# Patient Record
Sex: MALE | Race: BLACK OR AFRICAN AMERICAN | Employment: STUDENT | URBAN - METROPOLITAN AREA
[De-identification: names, ages, dates, MRNs, and addresses within clinical notes are randomized per-mention and may not be internally consistent; named-entity substitution may affect disease eponyms.]

---

## 2018-05-23 ENCOUNTER — HOSPITAL ENCOUNTER (OUTPATIENT)
Dept: OTHER | Age: 23
Discharge: HOME | End: 2018-05-31
Attending: EMERGENCY MEDICINE | Admitting: EMERGENCY MEDICINE

## 2018-05-25 ENCOUNTER — HOSPITAL ENCOUNTER (OUTPATIENT)
Dept: PHYSICAL THERAPY | Age: 23
Discharge: HOME | End: 2018-05-26
Admitting: EMERGENCY MEDICINE

## 2018-05-29 ENCOUNTER — HOSPITAL ENCOUNTER (OUTPATIENT)
Dept: PHYSICAL THERAPY | Age: 23
Discharge: HOME | End: 2018-05-30
Admitting: EMERGENCY MEDICINE

## 2018-05-30 ENCOUNTER — HOSPITAL ENCOUNTER (OUTPATIENT)
Dept: PHYSICAL THERAPY | Age: 23
Discharge: HOME | End: 2018-06-30
Admitting: EMERGENCY MEDICINE

## 2018-05-31 ENCOUNTER — HOSPITAL ENCOUNTER (OUTPATIENT)
Dept: PHYSICAL THERAPY | Age: 23
Discharge: HOME | End: 2018-06-01
Admitting: EMERGENCY MEDICINE

## 2018-06-01 ENCOUNTER — HOSPITAL ENCOUNTER (OUTPATIENT)
Dept: OTHER | Age: 23
Discharge: HOME | End: 2018-06-01
Attending: EMERGENCY MEDICINE | Admitting: EMERGENCY MEDICINE

## 2018-06-04 ENCOUNTER — HOSPITAL ENCOUNTER (OUTPATIENT)
Dept: PHYSICAL THERAPY | Age: 23
Discharge: HOME | End: 2018-06-05
Admitting: EMERGENCY MEDICINE

## 2018-06-06 ENCOUNTER — HOSPITAL ENCOUNTER (OUTPATIENT)
Dept: PHYSICAL THERAPY | Age: 23
Discharge: HOME | End: 2018-06-07
Admitting: EMERGENCY MEDICINE

## 2018-06-12 ENCOUNTER — HOSPITAL ENCOUNTER (OUTPATIENT)
Dept: PHYSICAL THERAPY | Age: 23
Discharge: HOME | End: 2018-06-13
Admitting: EMERGENCY MEDICINE

## 2018-06-14 ENCOUNTER — HOSPITAL ENCOUNTER (OUTPATIENT)
Dept: PHYSICAL THERAPY | Age: 23
Discharge: HOME | End: 2018-06-15
Admitting: EMERGENCY MEDICINE

## 2018-06-18 ENCOUNTER — HOSPITAL ENCOUNTER (OUTPATIENT)
Dept: PHYSICAL THERAPY | Age: 23
Discharge: HOME | End: 2018-06-19
Admitting: EMERGENCY MEDICINE

## 2018-06-21 ENCOUNTER — HOSPITAL ENCOUNTER (OUTPATIENT)
Dept: PHYSICAL THERAPY | Age: 23
Discharge: HOME | End: 2018-06-22
Admitting: EMERGENCY MEDICINE

## 2018-06-25 ENCOUNTER — HOSPITAL ENCOUNTER (OUTPATIENT)
Dept: PHYSICAL THERAPY | Age: 23
Discharge: HOME | End: 2018-06-26
Admitting: EMERGENCY MEDICINE

## 2018-06-28 ENCOUNTER — HOSPITAL ENCOUNTER (OUTPATIENT)
Dept: PHYSICAL THERAPY | Age: 23
Discharge: HOME | End: 2018-06-29
Admitting: EMERGENCY MEDICINE

## 2018-07-01 ENCOUNTER — HOSPITAL ENCOUNTER (OUTPATIENT)
Dept: OTHER | Age: 23
Discharge: HOME | End: 2018-07-31
Attending: EMERGENCY MEDICINE | Admitting: EMERGENCY MEDICINE

## 2018-07-10 ENCOUNTER — HOSPITAL ENCOUNTER (OUTPATIENT)
Dept: PHYSICAL THERAPY | Age: 23
Discharge: HOME | End: 2018-07-11
Admitting: EMERGENCY MEDICINE

## 2018-07-10 NOTE — PLAN OF CARE
Retro Stepper/BIKE   4min retro for glute activation    Slide retro lunges  2 10 70 deg -KB   RDL 2 15 50lbs    BOSU bridge- band 5s 10 Single leg holds (add SC pull NV)         Hip Ext /table 5s 10 Off table SS end    Bosu fwd/side lunge      Box Ecc control earth quake 2 12 Slow cuing for quad 0-50- no wt   Leg Press Ecc 0-70- slow  10 10 170/150lbs 0-70 heel up    Box /Floor plyos 10 10 Uni focusing on single leg acceptance    SC jogging and deceleration  10 10 Fwd/back    Glute kicks  4 15 2 maroon   TRX Uni Lunges 2 12 0-50   English Lunges with HHA 10 10 0-60 heel up    Sumo/[place]  10 10 Slow /SS-50   Chair single leg squat 10 10 2 airex   Manual Intervention-0min      Knee mobs/PROM      Tib/Fem Mobs      Patella Mobs  4 g. III/iv inf/sup/rot   Ankle mobs      DN   See below         NMR [id number]      BFR  10min 5lb, 168mmHg 30/15/15/15 (SLR, LAQ, retro lunge, 6in step up, squat)   G. Med activation/sidelying      E[place]on uni plyos NMR control Rt LE   4min     Hip Ext full ROM G. Activation      Bosu Bal and Prop- G Med 10 10 Quad control throughout rom   Single leg stance/Balance/Prop      Bosu Retro G. Med act 10 10 Glut control       Cues throughout for glute control           Spoke with Dr. Rhoda Scott regarding the use of BFR with patient. Bloodflow restriction was utilized throughout exercise session with use of Lovely Unit for a period of 8 minutes at  80% Occlusion. Patient's total limp occlusion pressure was calculated and monitored by the [facility] for the entire time of occlusion. Therapeutic Exercise and NMR EXR  [x] (27977) Provided verbal/tactile cueing for activities related to strengthening, flexibility, endurance, ROM for improvements in LE, proximal hip, and core control with self care, mobility, lifting, ambulation.   [x] (68615) Provided verbal/tactile cueing for activities related to improving balance, coordination, kinesthetic sense, posture, motor skill, proprioception RE-EVAL     [x] SC(78327) x  3   [] IONTO  [x] NMR (65199) x  1   [] VASO  [] Manual (79283) x       [] Other:  [] TA x       [] Mech Traction (89520)  [] ES(attended) (41048)      [] ES (un) (20425):     GOALS:  Patient stated goal: full FB in august    Therapist goals for Patient:   Short Term Goals: To be achieved in: 2 weeks  1. Independent in HEP and progression per patient tolerance, in order to prevent re-injury. -MET  2. Patient will have a decrease in pain to facilitate improvement in movement, function, and ADLs as indicated by Functional Deficits. -MET    Long Term Goals: To be achieved in: 8 weeks  1. Disability index score of 4% or less for the LEFS to assist with reaching prior level of function. -NOT MET  2. Patient will demonstrate increased AROM to WNL to allow for proper joint functioning as indicated by patients Functional Deficits. -MET  3. Patient will demonstrate an increase in Strength to good proximal hip strength and control, within 5lb HHD in LE to allow for proper functional mobility as indicated by patients Functional Deficits. -IMPROVED NOT MET  4. Patient will return to single leg eccentric functional activities without increased symptoms or restriction. -IMPROVED NOT MET  5. Patient to return to running, cutting, jumping without restriction. -PROGRESSING     Progression Towards Functional goals:  [x] Patient is progressing as expected towards functional goals listed. [] Progression is slowed due to complexities listed. [] Progression has been slowed due to co-morbidities. [] Plan just implemented, too soon to assess goals progression  [x] Other: anterior knee pain persist with jumping    ASSESSMENT:  Patient is demonstrated significant improvement associated with quadricep eccentric body weight control and lower extremity total body strength. Patient is able to ambulate transfer and squat with minimal to no complaints of anterior knee soreness or pain.   Continued weakness and reflexive inhibition persist with advanced sport activity including change of direction and cutting drills. Additional quadriceps eccentric control and strength is required before returning to full athletic activity. Return to Play: (if applicable)   [x]  Stage 1: Intro to Strength   [x]  Stage 2: Return to Run and Strength   [x]  Stage 3: Return to Jump and Strength   []  Stage 4: Dynamic Strength and Agility   []  Stage 5: Sport Specific Training     []  Ready to Return to Play, Meets All Above Stages   []  Not Ready for Return to Sports   Comments:                         Treatment/Activity Tolerance:  [x] Patient tolerated treatment well [] Patient limited by fatique  [] Patient limited by pain  [] Patient limited by other medical complications  [] Other:     Prognosis: [x] Good [] Fair  [] Poor    Patient Requires Follow-up: [x] Yes  [] No      PLAN: Progress with current physical therapy plan including lower summary body weight eccentric's and lower extremity plyometric activity working on single leg weight acceptance. Patient may require 1-2 additional dry needling treatments to the infrapatellar pole if anterior pain persists.     [x] Continue per plan of care [] Alter current plan (see comments)  [] Plan of care initiated [] Hold pending MD visit [] Discharge    Electronically signed by: Angie Olvera PT DPT [facility]

## 2018-07-12 ENCOUNTER — HOSPITAL ENCOUNTER (OUTPATIENT)
Dept: PHYSICAL THERAPY | Age: 23
Discharge: HOME | End: 2018-07-13
Admitting: EMERGENCY MEDICINE

## 2018-07-12 NOTE — FLOWSHEET NOTE
Provided training and instruction to the patient for proper LE, core and proximal hip recruitment and positioning and eccentric body weight control with ambulation re-education including up and down stairs     Home Exercise Program:    [x] (56011) Reviewed/Progressed HEP activities related to strengthening, flexibility, endurance, ROM of core, proximal hip and LE for functional self-care, mobility, lifting and ambulation/stair navigation   [] (77080)Reviewed/Progressed HEP activities related to improving balance, coordination, kinesthetic sense, posture, motor skill, proprioception of core, proximal hip and LE for self care, mobility, lifting, and ambulation/stair navigation      Manual Treatments:  PROM / STM / Oscillations-Mobs:  G-I, II, III, IV (PA's, Inf., Post.)  [x] (89723) Provided manual therapy to mobilize LE, proximal hip and/or LS spine soft tissue/joints for the purpose of modulating pain, promoting relaxation,  increasing ROM, reducing/eliminating soft tissue swelling/inflammation/restriction, improving soft tissue extensibility and allowing for proper ROM for normal function with self care, mobility, lifting and ambulation. Spoke with   regarding the use of Dry Needling     Dry needling manual therapy: consisted on the placement of 15 needles in the following muscles:  VM/VL/IT band and Heather Patella tendon. A 60-40 mm needle was inserted, piston, rotated, and coned to produce intramuscular mobilization. These techniques were used to restore functional range of motion, reduce muscle spasm and induce healing in the corresponding musculature. (62007)  Clean Technique was utilized today while applying Dry needling treatment. The treatment sites where cleaned with 70% solution of  isopropyl alcohol . The PT washed their hands and utilized treatment gloves along with hand  prior to inserting the needles.   All needles where removed and discarded in the appropriate sharps container. MD has given verbal and/or written approval for this treatment. Attended low frequency (1-20Hz) electrical stimulation was utilized in conjunction with Dry Needling:  the Estim was manipulated between all above needles for a period of 12 min. at 6  volts. The low frequency electrical stimulation was used to help reduce muscle spasm and help to interrupt /Lead[place] the pain cycle. (98954)     Modalities:  Game Ready x 10 min with high pressure. Charges:  Timed Code Treatment Minutes: 61   Total Treatment Minutes: 61     [] EVAL (LOW) 98682 (typically 20 minutes face-to-face)  [] EVAL (MOD) 98729 (typically 30 minutes face-to-face)  [] EVAL (HIGH) 57257 (typically 45 minutes face-to-face)  [] RE-EVAL     [x] PO(98600) x  2   [] IONTO  [x] NMR (19533) x  1   [] VASO  [x] Manual (97187) x  1    [] Other:  [] TA x       [] Mech Traction (94757)  [] ES(attended) (55611)      [] ES (un) (79806):     GOALS:  Patient stated goal: full FB in august    Therapist goals for Patient:   Short Term Goals: To be achieved in: 2 weeks  1. Independent in HEP and progression per patient tolerance, in order to prevent re-injury. 2. Patient will have a decrease in pain to facilitate improvement in movement, function, and ADLs as indicated by Functional Deficits. Long Term Goals: To be achieved in: 8 weeks  1. Disability index score of 4% or less for the LEFS to assist with reaching prior level of function. 2. Patient will demonstrate increased AROM to WNL to allow for proper joint functioning as indicated by patients Functional Deficits. 3. Patient will demonstrate an increase in Strength to good proximal hip strength and control, within 5lb HHD in LE to allow for proper functional mobility as indicated by patients Functional Deficits. 4. Patient will return to single leg eccentric functional activities without increased symptoms or restriction.    5. Patient to return to running, cutting, jumping without restriction. Progression Towards Functional goals:  [] Patient is progressing as expected towards functional goals listed. [] Progression is slowed due to complexities listed. [] Progression has been slowed due to co-morbidities. [x] Plan just implemented, too soon to assess goals progression  [] Other:     ASSESSMENT:  Patient continues to demonstrate improved quad/LE eccentric strength and control with unilateral activity. Additional LE eccentric control is required before beginning sprinting activity. Return to Play: (if applicable)   [x]  Stage 1: Intro to Strength   []  Stage 2: Return to Run and Strength   []  Stage 3: Return to Jump and Strength   []  Stage 4: Dynamic Strength and Agility   []  Stage 5: Sport Specific Training     []  Ready to Return to Play, Meets All Above Stages   []  Not Ready for Return to Sports   Comments:                         Treatment/Activity Tolerance:  [x] Patient tolerated treatment well [] Patient limited by fatique  [] Patient limited by pain  [] Patient limited by other medical complications  [] Other:     Prognosis: [x] Good [] Fair  [] Poor    Patient Requires Follow-up: [x] Yes  [] No      PLAN: Continue current physical therapy plan progress with lower extremity eccentric strength as tolerated as well as additional 1-2 dry needling treatments as needed per infrapatellar tendon pain.     [x] Continue per plan of care [] Alter current plan (see comments)  [] Plan of care initiated [] Hold pending MD visit [] Discharge    Electronically signed by: Ingrid Olvera PT

## 2018-07-17 ENCOUNTER — HOSPITAL ENCOUNTER (OUTPATIENT)
Dept: PHYSICAL THERAPY | Age: 23
Discharge: HOME | End: 2018-07-18
Admitting: EMERGENCY MEDICINE

## 2018-07-17 NOTE — FLOWSHEET NOTE
[address] [place] [address] [place] [address]  Phone: [phone] Fax: [phone]    Physical Therapy Daily Treatment Note  Date:  2018    Patient Name:  Yuri Polo    :  1995  MRN: [id number]  Restrictions/Precautions:    Medical/Treatment Diagnosis Information:  · Diagnosis: R knee patellar tendonitis  · Treatment Diagnosis: S49.772  Insurance/Certification information:  PT Insurance Information: [employer]  Physician Information:  Referring Practitioner: Tom Partida of care signed (Y/N):     Date of Patient follow up with Physician:     G-Code (if applicable):      Date G-Code Applied:    PT G-Codes  Functional Assessment Tool Used: LEFS  Score: 35%  Functional Limitation: Mobility: Walking and moving around  Mobility: Walking and Moving Around Current Status (): At least 20 percent but less than 40 percent impaired, limited or restricted  Mobility: Walking and Moving Around Goal Status (): At least 1 percent but less than 20 percent impaired, limited or restricted    Progress Note: [x]  Yes  []  No  Next due by: Visit #10       Latex Allergy:  [x]NO      []YES  Preferred Language for Healthcare:   [x]English       []other:    Visit # Insurance Allowable   15 60/MU   . Pain level:  4/10  Increased with cutting     SUBJECTIVE:  Patient reports with mild increase in anterior medial patellofemoral soreness and pain associated with cutting and running activities. Patient continues to experience mild difficulty and weakness associated with advanced athletic activity.     OBJECTIVE: See eval  Observation:   Test measurements:      RESTRICTIONS/PRECAUTIONS: poss PT tear    Exercises/Interventions:     Therapeutic Ex 44min Sets/sec Reps Notes   Retro Stepper/BIKE   4min retro for glute activation    Slide rotational lunges  2 10 Slow at 60 deg   RDL 2 15 50lbs    BOSU bridge 5s 10 Single leg holds (add SC pull NV)         Hip Ext /table 5s 10 Off table SS end    Bosu fwd/side lunge      Box Ecc control earth quake 2 12 Slow cuing for quad 0-50- no wt   Leg Press Ecc 0-70 10 10 170/150lbs 0-70 heel up    Box plyo lunges  2 12 12' bossman/broads/2:1- P   Low plyos sith Rt leg in Ecc pos 2 20 60 deg. Glute kicks  4 15 2 maroon   TRX Uni Lunges 2 12 0-50   [place] Lunges with HHA 10 10 0-60 slosh tube with pertubation    Sumo/Indonesian  10 10 Slow /SS-50   BFR Strength   8min 5lb, 150mmHg 30/15/15/15 (SLR, LAQ, retro lunge, 6in step up, squat)   Chair Uni  Squats 90 deg 10 10 2 x airex         Manual Intervention-4min      Knee mobs/PROM      Tib/Fem Mobs      Patella Mobs  4 g. III/iv inf/sup/rot   Ankle mobs            NMR [id number]      G. Med activation/sidelying      G. Max Activation/prone      Hip Ext full ROM G. Activation      Bosu Bal and Prop- G Med 10 10 Quad control throughout rom   Single leg stance/Balance/Prop      Bosu Retro G. Med act 10 10 Glut control       Cues throughout for glute control         Full LE eccentric program with focus on quad ecc control and LE plyometric with landing control    **  BFR per Dr. Ariane Stewart 158mmhg    . Spoke with Dr. Ariane Stewart regarding the use of BFR with patient. Bloodflow restriction was utilized throughout exercise session with use of Lovely Unit for a period of 8 minutes at  80% Occlusion. Patient's total limp occlusion pressure was calculated and monitored by the [facility] for the entire time of occlusion. Therapeutic Exercise and NMR EXR  [x] (82177) Provided verbal/tactile cueing for activities related to strengthening, flexibility, endurance, ROM for improvements in LE, proximal hip, and core control with self care, mobility, lifting, ambulation.   [x] (34473) Provided verbal/tactile cueing for activities related to improving balance, coordination, kinesthetic sense, posture, motor skill, proprioception  to assist with LE, proximal hip, and core control in self care, mobility, lifting, ambulation and eccentric single leg control. NMR and Therapeutic Activities:    [x] (60462 or 61712) Provided verbal/tactile cueing for activities related to improving balance, coordination, kinesthetic sense, posture, motor skill, proprioception and motor activation to allow for proper function of core, proximal hip and LE with self care and ADLs  [] (96871) Gait Re-education- Provided training and instruction to the patient for proper LE, core and proximal hip recruitment and positioning and eccentric body weight control with ambulation re-education including up and down stairs     Home Exercise Program:    [x] (36678) Reviewed/Progressed HEP activities related to strengthening, flexibility, endurance, ROM of core, proximal hip and LE for functional self-care, mobility, lifting and ambulation/stair navigation   [] (16946)Reviewed/Progressed HEP activities related to improving balance, coordination, kinesthetic sense, posture, motor skill, proprioception of core, proximal hip and LE for self care, mobility, lifting, and ambulation/stair navigation      Manual Treatments:  PROM / STM / Oscillations-Mobs:  G-I, II, III, IV (PA's, Inf., Post.)  [x] (96744) Provided manual therapy to mobilize LE, proximal hip and/or LS spine soft tissue/joints for the purpose of modulating pain, promoting relaxation,  increasing ROM, reducing/eliminating soft tissue swelling/inflammation/restriction, improving soft tissue extensibility and allowing for proper ROM for normal function with self care, mobility, lifting and ambulation.      Spoke with   regarding the use of Dry Needling       Modalities:      Charges:  Timed Code Treatment Minutes: 54   Total Treatment Minutes: 62     [] EVAL (LOW) 88611 (typically 20 minutes face-to-face)  [] EVAL (MOD) 20742 (typically 30 minutes face-to-face)  [] EVAL (HIGH) 42781 (typically 45 minutes face-to-face)  [] RE-EVAL     [x] YZ(97133) x  2

## 2018-07-19 ENCOUNTER — HOSPITAL ENCOUNTER (OUTPATIENT)
Dept: PHYSICAL THERAPY | Age: 23
Discharge: HOME | End: 2018-07-20
Admitting: EMERGENCY MEDICINE

## 2018-07-19 NOTE — FLOWSHEET NOTE
leg holds (add SC pull NV)         Hip Ext /table 5s 10 Off table SS end    Bosu fwd/side lunge      Box Ecc control earth quake 2 12 Slow cuing for quad 0-50- no wt   Leg Press Ecc 0-70 10 10 170/150lbs 0-70 heel up    [place] Lunges with HHA 10 10 0-60 slosh tube with pertubation    BFR Strength   8min 5lb, 150mmHg 30/15/15/15 (SLR, LAQ, retro lunge, 6in step up, squat)   Chair Uni  Squats 90 deg 10 10 2 x airex         Manual Intervention-18min      Knee mobs/PROM      Tib/Fem Mobs      Patella Mobs  4 g. III/iv inf/sup/rot   Ankle mobs      DN  16min 15 needles See below         NMR [id number]      G. Med activation/sidelying      G. Max Activation/prone      Hip Ext full ROM G. Activation      Bosu Bal and Prop- G Med 10 10 Quad control throughout rom   Single leg stance/Balance/Prop      Bosu Retro G. Med act 10 10 Glut control       Cues throughout for glute control         Full LE eccentric program with focus on quad ecc control and LE plyometric with landing control    **  BFR per Dr. William Bender 148mmhg    Spoke with Dr. William Bender regarding the use of BFR with patient. Bloodflow restriction was utilized throughout exercise session with use of Lovely Unit for a period of 8 minutes at  80% Occlusion. Patient's total limp occlusion [facility] occlusion. Spoke with   regarding the use of Dry Needling     Dry needling manual therapy: consisted on the placement of 12 needles in the following muscles:  VL/VM/IT/Heather patella . A 50-60 mm needle was inserted, piston, rotated, and coned to produce intramuscular mobilization. These techniques were used to restore functional range of motion, reduce muscle spasm and induce healing in the corresponding musculature. (22591)  Clean Technique was utilized today while applying Dry needling treatment. The treatment sites where cleaned with 70% solution of  isopropyl alcohol .   The PT washed their hands and utilized treatment gloves along with hand  prior to inserting the needles. All needles where removed and discarded in the appropriate sharps container. MD has given verbal and/or written approval for this treatment. Attended low frequency (1-20Hz) electrical stimulation was utilized in conjunction with Dry Needling:  the Estim was manipulated between all above needles for a period of 16 min. at 6 volts. The low frequency electrical stimulation was used to help reduce muscle spasm and help to interrupt /[place] the pain cycle. (97855)           Therapeutic Exercise and NMR EXR  [x] (89388) Provided verbal/tactile cueing for activities related to strengthening, flexibility, endurance, ROM for improvements in LE, proximal hip, and core control with self care, mobility, lifting, ambulation. [x] (26157) Provided verbal/tactile cueing for activities related to improving balance, coordination, kinesthetic sense, posture, motor skill, proprioception  to assist with LE, proximal hip, and core control in self care, mobility, lifting, ambulation and eccentric single leg control.      NMR and Therapeutic Activities:    [x] (87047 or 42123) Provided verbal/tactile cueing for activities related to improving balance, coordination, kinesthetic sense, posture, motor skill, proprioception and motor activation to allow for proper function of core, proximal hip and LE with self care and ADLs  [] (44464) Gait Re-education- Provided training and instruction to the patient for proper LE, core and proximal hip recruitment and positioning and eccentric body weight control with ambulation re-education including up and down stairs     Home Exercise Program:    [x] (21720) Reviewed/Progressed HEP activities related to strengthening, flexibility, endurance, ROM of core, proximal hip and LE for functional self-care, mobility, lifting and ambulation/stair navigation   [] (09128)Reviewed/Progressed HEP control, within 5lb HHD in LE to allow for proper functional mobility as indicated by patients Functional Deficits. 4. Patient will return to single leg eccentric functional activities without increased symptoms or restriction. 5. Patient to return to running, cutting, jumping without restriction. Progression Towards Functional goals:  [] Patient is progressing as expected towards functional goals listed. [] Progression is slowed due to complexities listed. [] Progression has been slowed due to co-morbidities. [x] Plan just implemented, too soon to assess goals progression  [] Other:     ASSESSMENT: Overall patient demonstrates noted improvement with right quadriceps eccentric strength and body weight deceleration ability. Patient continues to experience anterior medial patella pole soreness and pain associated with close chain activity and sport related activity. Additional quadriceps strength and eccentric control is required prior to returning to full activity. Return to Play: (if applicable)   [x]  Stage 1: Intro to Strength   [x]  Stage 2: Return to Run and Strength   [x]  Stage 3: Return to Jump and Strength- per ant knee soreness   []  Stage 4: Dynamic Strength and Agility   []  Stage 5: Sport Specific Training     []  Ready to Return to Play, Meets All Above Stages   []  Not Ready for Return to Sports   Comments:   Begin light deceleration activity and change of direction. Treatment/Activity Tolerance:  [x] Patient tolerated treatment well [] Patient limited by fatique  [] Patient limited by pain  [] Patient limited by other medical complications  [] Other:     Prognosis: [x] Good [] Fair  [] Poor    Patient Requires Follow-up: [x] Yes  [] No      PLAN: Continue current physical therapy plan progressing with quadricep eccentric control and strength and progress into dynamic cutting activity per functional strength and pain tolerance.     [x] Continue per plan of care []

## 2018-07-24 ENCOUNTER — HOSPITAL ENCOUNTER (OUTPATIENT)
Dept: PHYSICAL THERAPY | Age: 23
Discharge: HOME | End: 2018-07-25
Admitting: EMERGENCY MEDICINE

## 2018-07-24 NOTE — FLOWSHEET NOTE
[address] [place] [address]  Phone: [phone] Fax: [phone]    Physical Therapy Daily Treatment Note  Date:  2018    Patient Name:  Chantal Azar    :  1995  MRN: [id number]  Restrictions/Precautions:    Medical/Treatment Diagnosis Information:  · Diagnosis: R knee patellar tendonitis  · Treatment Diagnosis: V12.068  Insurance/Certification information:  PT Insurance Information: [place]  Physician Information:  Referring Practitioner: Flaco Jin of care signed (Y/N):     Date of Patient follow up with Physician:     G-Code (if applicable):      Date G-Code Applied:    PT G-Codes  Functional Assessment Tool Used: LEFS  Score: 35%  Functional Limitation: Mobility: Walking and moving around  Mobility: Walking and Moving Around Current Status (): At least 20 percent but less than 40 percent impaired, limited or restricted  Mobility: Walking and Moving Around Goal Status (): At least 1 percent but less than 20 percent impaired, limited or restricted    Progress Note: [x]  Yes  []  No  Next due by: Visit #10       Latex Allergy:  [x]NO      []YES  Preferred Language for Healthcare:   [x]English       []other:    Visit # Insurance Allowable   17 60/MU   . Pain level:  3/10  Increased with cutting     SUBJECTIVE:   Patient reports with noted improvement associated with anterior medial patella soreness after last dry needling treatment. She continues to experience mild difficulty and soreness associated with advanced athletic activity including change of direction drills. Stair ambulation and squatting/transfers have improved immensely since beginning therapy.     OBJECTIVE: See eval  Observation:   Test measurements:      RESTRICTIONS/PRECAUTIONS: poss PT tear    Exercises/Interventions:     Therapeutic Ex 47min Sets/sec Reps Notes   Retro Stepper/BIKE   4min retro for glute activation Slide rotational lunges  2 10 Slow at 60 deg   RDL or Ecc HS 2 15 50lbs /sliders    BOSU bridge 5s 10 Single leg holds (add SC pull NV)   SC Ecc and hogging   10 Slow ecc. Hip Ext /table 5s 10 Off table SS end    Bosu fwd/side lunge      Box Ecc control earth quake 2 12 Slow cuing for quad 0-50- no wt   Leg Press Ecc 0-70 10 10 170/150lbs 0-70 heel up    Box plyo lunges  2 12 12' bossman/broads/2:1- P   Low plyos sith Rt leg in Ecc pos 2 20 60 deg. Glute kicks  4 15 2 maroon   TRX Uni Lunges 2 12 0-50   [place] Lunges with HHA 10 10 0-60 slosh tube with pertubation    Sumo/Persian  10 10 Slow /SS-50   BFR Strength   8min 5lb, 148mmHg 30/15/15/15 (SLR, LAQ, retro lunge, 6in step up, squat)   NORM 90-0 5sets  VSRP    Manual Intervention-2min      Knee mobs/PROM      Tib/Fem Mobs      Patella Mobs  4 g. III/iv inf/sup/rot   Ankle mobs            NMR [id number]      G. Med activation/sidelying      G. Max Activation/prone      Hip Ext full ROM G. Activation      Bosu Bal and Prop- G Med 10 10 Quad control throughout rom   Single leg stance/Balance/Prop      Bosu Retro G. Med act 10 10 Glut control       Cues throughout for glute control         Full LE eccentric program with focus on quad ecc control and LE plyometric with landing control    **  BFR per Dr. Theresa Johnson 148mmhg    Spoke with Dr. Theresa Johnson regarding the use of BFR with patient. Bloodflow restriction was utilized throughout exercise session with use of [facility] for a period of 8 minutes at  80% Occlusion. Patient's total limp occlusion pressure [facility] the entire time of occlusion.     Spoke with   regarding the use of Dry Needling           Therapeutic Exercise and NMR EXR  [x] (07229) Provided verbal/tactile cueing for activities related to strengthening, flexibility, endurance, ROM for improvements in LE, proximal hip, and core control with self care, mobility, lifting, ambulation. [x] (12656) Provided verbal/tactile cueing for activities related to improving balance, coordination, kinesthetic sense, posture, motor skill, proprioception  to assist with LE, proximal hip, and core control in self care, mobility, lifting, ambulation and eccentric single leg control. NMR and Therapeutic Activities:    [x] (44117 or 35837) Provided verbal/tactile cueing for activities related to improving balance, coordination, kinesthetic sense, posture, motor skill, proprioception and motor activation to allow for proper function of core, proximal hip and LE with self care and ADLs  [] (22089) Gait Re-education- Provided training and instruction to the patient for proper LE, core and proximal hip recruitment and positioning and eccentric body weight control with ambulation re-education including up and down stairs     Home Exercise Program:    [x] (83326) Reviewed/Progressed HEP activities related to strengthening, flexibility, endurance, ROM of core, proximal hip and LE for functional self-care, mobility, lifting and ambulation/stair navigation   [] (69075)Reviewed/Progressed HEP activities related to improving balance, coordination, kinesthetic sense, posture, motor skill, proprioception of core, proximal hip and LE for self care, mobility, lifting, and ambulation/stair navigation      Manual Treatments:  PROM / STM / Oscillations-Mobs:  G-I, II, III, IV (PA's, Inf., Post.)  [x] (07147) Provided manual therapy to mobilize LE, proximal hip and/or LS spine soft tissue/joints for the purpose of modulating pain, promoting relaxation,  increasing ROM, reducing/eliminating soft tissue swelling/inflammation/restriction, improving soft tissue extensibility and allowing for proper ROM for normal function with self care, mobility, lifting and ambulation.      Spoke with   regarding the use of Dry Needling       Modalities:      Charges:  Timed Code Treatment Minutes: 57   Total Treatment Minutes:

## 2018-07-26 ENCOUNTER — HOSPITAL ENCOUNTER (OUTPATIENT)
Dept: PHYSICAL THERAPY | Age: 23
Discharge: HOME | End: 2018-07-27
Admitting: EMERGENCY MEDICINE

## 2018-07-26 NOTE — FLOWSHEET NOTE
[facility] [address] [address] [place]  Phone: [phone] Fax: [phone]    Physical Therapy Daily Treatment Note  Date:  2018    Patient Name:  Hector Gan    :  1995  MRN: [id number]  Restrictions/Precautions:    Medical/Treatment Diagnosis Information:  · Diagnosis: R knee patellar tendonitis  · Treatment Diagnosis: H36.922  Insurance/Certification information:  PT Insurance Information: [place]  Physician Information:  Referring Practitioner: Elmer Kruse of care signed (Y/N):     Date of Patient follow up with Physician:     G-Code (if applicable):      Date G-Code Applied:    PT G-Codes  Functional Assessment Tool Used: LEFS  Score: 35%  Functional Limitation: Mobility: Walking and moving around  Mobility: Walking and Moving Around Current Status (): At least 20 percent but less than 40 percent impaired, limited or restricted  Mobility: Walking and Moving Around Goal Status (): At least 1 percent but less than 20 percent impaired, limited or restricted    Progress Note: [x]  Yes  []  No  Next due by: Visit #10       Latex Allergy:  [x]NO      []YES  Preferred Language for Healthcare:   [x]English       []other:    Visit # Insurance Allowable   18 60/MU   . Pain level:  3/10  Increased with cutting     SUBJECTIVE:  Patient reports with noted increase in. Patella soreness after last treatment associate with close chain strengthening and change of direction drills. She reports today with improving strength however continued anterior inferior medial patella tendon soreness.     OBJECTIVE: See eval  Observation:   Test measurements:      RESTRICTIONS/PRECAUTIONS: poss PT tear    Exercises/Interventions:     Therapeutic Ex 6min Sets/sec Reps Notes   Retro Stepper/BIKE   4min retro for glute activation    Slide rotational lunges  2 10 Slow at 60 deg   RDL or Ecc HS 2 15 50lbs /sliders    Belt required before change of direction can be completed. Return to Play: (if applicable)   [x]  Stage 1: Intro to Strength   [x]  Stage 2: Return to Run and Strength   [x]  Stage 3: Return to Jump and Strength- per ant knee soreness   []  Stage 4: Dynamic Strength and Agility   []  Stage 5: Sport Specific Training     []  Ready to Return to Play, Meets All Above Stages   []  Not Ready for Return to Sports   Comments:   Begin light deceleration activity and change of direction. Treatment/Activity Tolerance:  [x] Patient tolerated treatment well [] Patient limited by fatique  [] Patient limited by pain  [] Patient limited by other medical complications  [] Other:     Prognosis: [x] Good [] Fair  [] Poor    Patient Requires Follow-up: [x] Yes  [] No      PLAN: Discontinue dry needling at this time. Continue to progress with body weight eccentric's and loaded quadricep eccentric activity and progress into plyometric activity per tolerance.     [x] Continue per plan of care [] Alter current plan (see comments)  [] Plan of care initiated [] Hold pending MD visit [] Discharge    Electronically signed by: Flor Medina PT DPT [facility]

## 2018-08-01 ENCOUNTER — HOSPITAL ENCOUNTER (OUTPATIENT)
Dept: OTHER | Age: 23
Discharge: HOME | End: 2018-08-31
Attending: EMERGENCY MEDICINE | Admitting: EMERGENCY MEDICINE

## 2018-08-06 ENCOUNTER — HOSPITAL ENCOUNTER (OUTPATIENT)
Dept: PHYSICAL THERAPY | Age: 23
Discharge: HOME | End: 2018-08-07
Admitting: EMERGENCY MEDICINE

## 2018-08-06 NOTE — FLOWSHEET NOTE
5lb, 148mmHg 30/15/15/15 (SLR, LAQ, retro lunge, 6in step up, squat)   Manual Intervention-10min      Knee mobs/PROM      Tib/Fem Mobs      Patella Mobs  6 g. III/iv inf/sup/rot   Ankle mobs      DN [place]  10'  Patella    NMR [id number]      G. Med activation/sidelying      G. Max Activation/prone      Bosu Bal and Prop- G Med 10 10 Quad control throughout rom         Full LE eccentric program with focus on quad ecc control and LE plyometric with landing control    **  BFR per Dr. Rinku Lisa 148mmhg                Therapeutic Exercise and NMR EXR  [x] (76126) Provided verbal/tactile cueing for activities related to strengthening, flexibility, endurance, ROM for improvements in LE, proximal hip, and core control with self care, mobility, lifting, ambulation. [x] (00972) Provided verbal/tactile cueing for activities related to improving balance, coordination, kinesthetic sense, posture, motor skill, proprioception  to assist with LE, proximal hip, and core control in self care, mobility, lifting, ambulation and eccentric single leg control.      NMR and Therapeutic Activities:    [x] (51548 or 90010) Provided verbal/tactile cueing for activities related to improving balance, coordination, kinesthetic sense, posture, motor skill, proprioception and motor activation to allow for proper function of core, proximal hip and LE with self care and ADLs  [] (77151) Gait Re-education- Provided training and instruction to the patient for proper LE, core and proximal hip recruitment and positioning and eccentric body weight control with ambulation re-education including up and down stairs     Home Exercise Program:    [x] (49317) Reviewed/Progressed HEP activities related to strengthening, flexibility, endurance, ROM of core, proximal hip and LE for functional self-care, mobility, lifting and ambulation/stair navigation   [] (62432)Reviewed/Progressed HEP activities related to improving balance, coordination, kinesthetic sense, posture, motor skill, proprioception of core, proximal hip and LE for self care, mobility, lifting, and ambulation/stair navigation      Manual Treatments:  PROM / STM / Oscillations-Mobs:  G-I, II, III, IV (PA's, Inf., Post.)  [x] (49807) Provided manual therapy to mobilize LE, proximal hip and/or LS spine soft tissue/joints for the purpose of modulating pain, promoting relaxation,  increasing ROM, reducing/eliminating soft tissue swelling/inflammation/restriction, improving soft tissue extensibility and allowing for proper ROM for normal function with self care, mobility, lifting and ambulation. Spoke with   regarding the use of Dry Needling       Modalities:      Charges:  Timed Code Treatment Minutes: 45   Total Treatment Minutes: 45     [] EVAL (LOW) 89809 (typically 20 minutes face-to-face)  [] EVAL (MOD) 96363 (typically 30 minutes face-to-face)  [] EVAL (HIGH) 83036 (typically 45 minutes face-to-face)  [] RE-EVAL     [x] VV(05775) x  2   [] IONTO  [] NMR (07265) x      [] VASO  [x] Manual (23712) x  1    [] Other:  [] TA x       [] Mech Traction (13590)  [] ES(attended) (46176)      [] ES (un) (27195):     GOALS:  Patient stated goal: full FB in august    Therapist goals for Patient:   Short Term Goals: To be achieved in: 2 weeks  1. Independent in HEP and progression per patient tolerance, in order to prevent re-injury. 2. Patient will have a decrease in pain to facilitate improvement in movement, function, and ADLs as indicated by Functional Deficits. Long Term Goals: To be achieved in: 8 weeks  1. Disability index score of 4% or less for the LEFS to assist with reaching prior level of function. 2. Patient will demonstrate increased AROM to WNL to allow for proper joint functioning as indicated by patients Functional Deficits.    3. Patient will demonstrate an increase in Strength to good proximal hip strength and control, within 5lb HHD in LE to allow for proper functional mobility as indicated by patients Functional Deficits. 4. Patient will return to single leg eccentric functional activities without increased symptoms or restriction. 5. Patient to return to running, cutting, jumping without restriction. Progression Towards Functional goals:  [] Patient is progressing as expected towards functional goals listed. [] Progression is slowed due to complexities listed. [] Progression has been slowed due to co-morbidities. [x] Plan just implemented, too soon to assess goals progression  [] Other:     ASSESSMENT:  Overall patient reports feeling 75% improved overall with anterior medial patella tendon soreness and pain. Patient displays noted improvement with quadricep eccentric control and body weight control. Additional quadriceps strength and eccentric control is required before change of direction can be completed. Return to Play: (if applicable)   [x]  Stage 1: Intro to Strength   [x]  Stage 2: Return to Run and Strength   [x]  Stage 3: Return to Jump and Strength- per ant knee soreness   []  Stage 4: Dynamic Strength and Agility   []  Stage 5: Sport Specific Training     []  Ready to Return to Play, Meets All Above Stages   []  Not Ready for Return to Sports   Comments:   Begin light deceleration activity and change of direction. Treatment/Activity Tolerance:  [x] Patient tolerated treatment well [] Patient limited by fatique  [] Patient limited by pain  [] Patient limited by other medical complications  [] Other:     Prognosis: [x] Good [] Fair  [] Poor    Patient Requires Follow-up: [x] Yes  [] No      PLAN: Discontinue dry needling at this time. Continue to progress with body weight eccentric's and loaded quadricep eccentric activity and progress into plyometric activity per tolerance.     [x] Continue per plan of care [] Alter current plan (see comments)  [] Plan of care initiated [] Hold pending MD visit [] Discharge    Electronically signed by: Allen Mahoney, PTA

## 2018-08-09 ENCOUNTER — HOSPITAL ENCOUNTER (OUTPATIENT)
Dept: PHYSICAL THERAPY | Age: 23
Discharge: HOME | End: 2018-08-10
Admitting: EMERGENCY MEDICINE

## 2018-08-09 NOTE — FLOWSHEET NOTE
[address] [place] [address] [place] [address]  Phone: [phone] Fax: [phone]    Physical Therapy Daily Treatment Note  Date:  2018    Patient Name:  Yoli Spann    :  1995  MRN: [id number]  Restrictions/Precautions:    Medical/Treatment Diagnosis Information:  · Diagnosis: R knee patellar tendonitis  · Treatment Diagnosis: C12.704  Insurance/Certification information:  PT Insurance Information: [place] [employer]  Physician Information:  Referring Practitioner: Alonso Wong of care signed (Y/N):     Date of Patient follow up with Physician:     G-Code (if applicable):      Date G-Code Applied:    PT G-Codes  Functional Assessment Tool Used: LEFS  Score: 35%  Functional Limitation: Mobility: Walking and moving around  Mobility: Walking and Moving Around Current Status (): At least 20 percent but less than 40 percent impaired, limited or restricted  Mobility: Walking and Moving Around Goal Status (): At least 1 percent but less than 20 percent impaired, limited or restricted    Progress Note: [x]  Yes  []  No  Next due by: Visit #10       Latex Allergy:  [x]NO      []YES  Preferred Language for Healthcare:   [x]English       []other:    Visit # Insurance Allowable   20 60/MU   . Pain level:  3/10  Increased with cutting     SUBJECTIVE:  Patient reports with mild increase in anterior medial right patella soreness and weakness associated with change of direction and advanced athletic activity.   Patient reports noted increase in soreness secondary to increase in football activity and double sessions /camp     OBJECTIVE: See eval  Observation:   Test measurements:      RESTRICTIONS/PRECAUTIONS: poss PT tear    Exercises/Interventions:     Therapeutic Ex 32min Sets/sec Reps Notes   Retro Stepper/BIKE   4min retro for glute activation    Slide rotational lunges  RDL or Ecc HS 2 15 50lbs /sliders    Belt quad stretching 10 10    Quad activation after Dn 10 10    Leg Press Ecc 0-70 10 10 170/150lbs 0-70 heel up    Low plyos sith Rt leg in Ecc pos 2 20 60 deg. Glute kicks  4 15 2 maroon   TRX Uni Lunges 2 12 0-50   [place] Lunges with HHA 10 10 0-60 slosh tube with pertubation    Glute wall isos c med ball5\" 10 b   Sumo/Icelandic  10 10 Slow /SS-50   BFR Strength   8min 5lb, 148mmHg 30/15/15/15 (SLR, LAQ, retro lunge, 6in step up, squat)   Manual Intervention-10min      Knee mobs/PROM      Tib/Fem Mobs      Patella Mobs  6min g. III/iv inf/sup/rot   IASTM  4min Patella tendon   DN    Patella    NMR [id number]      G. Med activation/sidelying      G. Max Activation/prone      Bosu Bal and Prop- G Med 10 10 Quad control throughout rom   Single leg stance/Balance/Prop      Bosu Retro G. Med act 10 10 Glut control       Cues throughout for glute control         Full LE eccentric program with focus on quad ecc control and LE plyometric with landing control    **  BFR per Dr. Froy Yoder 148mmhg        Therapeutic Exercise and NMR EXR  [x] (58574) Provided verbal/tactile cueing for activities related to strengthening, flexibility, endurance, ROM for improvements in LE, proximal hip, and core control with self care, mobility, lifting, ambulation. [x] (61275) Provided verbal/tactile cueing for activities related to improving balance, coordination, kinesthetic sense, posture, motor skill, proprioception  to assist with LE, proximal hip, and core control in self care, mobility, lifting, ambulation and eccentric single leg control.      NMR and Therapeutic Activities:    [x] (72154 or 12866) Provided verbal/tactile cueing for activities related to improving balance, coordination, kinesthetic sense, posture, motor skill, proprioception and motor activation to allow for proper function of core, proximal hip and LE with self care and ADLs  [] (21995) Gait Re-education- Provided training and instruction to the patient for proper LE, core and proximal hip recruitment and positioning and eccentric body weight control with ambulation re-education including up and down stairs     Home Exercise Program:    [x] (49957) Reviewed/Progressed HEP activities related to strengthening, flexibility, endurance, ROM of core, proximal hip and LE for functional self-care, mobility, lifting and ambulation/stair navigation   [] (50683)Reviewed/Progressed HEP activities related to improving balance, coordination, kinesthetic sense, posture, motor skill, proprioception of core, proximal hip and LE for self care, mobility, lifting, and ambulation/stair navigation      Manual Treatments:  PROM / STM / Oscillations-Mobs:  G-I, II, III, IV (PA's, Inf., Post.)  [x] (99978) Provided manual therapy to mobilize LE, proximal hip and/or LS spine soft tissue/joints for the purpose of modulating pain, promoting relaxation,  increasing ROM, reducing/eliminating soft tissue swelling/inflammation/restriction, improving soft tissue extensibility and allowing for proper ROM for normal function with self care, mobility, lifting and ambulation. Spoke with   regarding the use of Dry Needling       Modalities:      Charges:  Timed Code Treatment Minutes: 47min   Total Treatment Minutes: 47min     [] EVAL (LOW) 12858 (typically 20 minutes face-to-face)  [] EVAL (MOD) 34029 (typically 30 minutes face-to-face)  [] EVAL (HIGH) 68297 (typically 45 minutes face-to-face)  [] RE-EVAL     [x] YH(96143) x  2   [] IONTO  [] NMR (27776) x      [] VASO  [x] Manual (12279) x  1    [] Other:  [] TA x       [] Mech Traction (93628)  [] ES(attended) (50730)      [] ES (un) (42926):     GOALS:  Patient stated goal: full FB in august    Therapist goals for Patient:   Short Term Goals: To be achieved in: 2 weeks  1. Independent in HEP and progression per patient tolerance, in order to prevent re-injury.    2. Patient will have a decrease in pain to facilitate improvement in movement, function,

## 2018-08-13 ENCOUNTER — HOSPITAL ENCOUNTER (OUTPATIENT)
Dept: PHYSICAL THERAPY | Age: 23
Discharge: HOME | End: 2018-08-14
Admitting: EMERGENCY MEDICINE

## 2018-08-13 NOTE — PLAN OF CARE
stretching 10 10 end         Hip Ext /table- DB 5s 10 15lbs each   Bosu fwd/side [id number] Ecc control earth quake 2 12 12' BOX   Leg Press Ecc 0-70 10 10 170/150lbs 0-70 heel up    Glute kicks  4 15 2 maroon   TRX Uni Lunges 2 12 0-50   Glute wall isos c med ball5\" 10 b   BFR Strength   8min 5lb, 148mmHg 30/15/15/15 (SLR, LAQ, retro lunge, 6in step up, squat)   Chair Uni  Squats 90 deg 10 10 2 x airex   Manual Intervention-14min      Knee mobs/PROM      Tib/Fem Mobs      Patella Mobs  4min g. III/iv inf/sup/rot   IASTM  0min Patella tendon   DN 10min 8 needles See below      Patella    NMR [id number]      G. Med activation/sidelying      G. Max Activation/prone      Bosu Bal and Prop- G Med 10 10 Quad control throughout rom   Single leg stance/Balance/Prop      Bosu Retro G. Med act 10 10 Glut control       Cues throughout for glute control         Full LE eccentric program with focus on quad ecc control and LE plyometric with landing control    **  BFR per Dr. Mars Credit 158mmhg        Therapeutic Exercise and NMR EXR  [x] (69181) Provided verbal/tactile cueing for activities related to strengthening, flexibility, endurance, ROM for improvements in LE, proximal hip, and core control with self care, mobility, lifting, ambulation. [x] (54217) Provided verbal/tactile cueing for activities related to improving balance, coordination, kinesthetic sense, posture, motor skill, proprioception  to assist with LE, proximal hip, and core control in self care, mobility, lifting, ambulation and eccentric single leg control.      NMR and Therapeutic Activities:    [x] (08710 or 09330) Provided verbal/tactile cueing for activities related to improving balance, coordination, kinesthetic sense, posture, motor skill, proprioception and motor activation to allow for proper function of core, proximal hip and LE with self care and ADLs  [] (54029) Gait Re-education- Provided training and instruction to the patient for proper LE,

## 2018-08-16 ENCOUNTER — HOSPITAL ENCOUNTER (OUTPATIENT)
Dept: PHYSICAL THERAPY | Age: 23
Discharge: HOME | End: 2018-08-17
Admitting: EMERGENCY MEDICINE

## 2018-08-16 NOTE — FLOWSHEET NOTE
2 12 12' BOX   Leg Press Ecc 0-70 10 10 170/150lbs 0-70 heel up    Glute kicks  4 15 2 maroon   TRX Uni Lunges 2 12 0-50   Glute wall isos c med ball5\" 10 b   BFR Strength   8min 5lb, 148mmHg 30/15/15/15 (SLR, LAQ, retro lunge, 6in step up, squat)   Chair Uni  Squats 90 deg 10 10 2 x airex   Manual Intervention-14min      Knee mobs/PROM      Tib/Fem Mobs      Patella Mobs  4min g. III/iv inf/sup/rot   IASTM  0min Patella tendon   DN 10min 8 needles See below      Patella    NMR [id number]      G. Med activation/sidelying      G. Max Activation/prone      Bosu Bal and Prop- G Med 10 10 Quad control throughout rom   Single leg stance/Balance/Prop      Bosu Retro G. Med act 10 10 Glut control       Cues throughout for glute control         Full LE eccentric program with focus on quad ecc control and LE plyometric with landing control    **  BFR per Dr. Kay Sanchez 158mmhg        Therapeutic Exercise and NMR EXR  [x] (21479) Provided verbal/tactile cueing for activities related to strengthening, flexibility, endurance, ROM for improvements in LE, proximal hip, and core control with self care, mobility, lifting, ambulation. [x] (48044) Provided verbal/tactile cueing for activities related to improving balance, coordination, kinesthetic sense, posture, motor skill, proprioception  to assist with LE, proximal hip, and core control in self care, mobility, lifting, ambulation and eccentric single leg control.      NMR and Therapeutic Activities:    [x] (43686 or 39221) Provided verbal/tactile cueing for activities related to improving balance, coordination, kinesthetic sense, posture, motor skill, proprioception and motor activation to allow for proper function of core, proximal hip and LE with self care and ADLs  [] (63344) Gait Re-education- Provided training and instruction to the patient for proper LE, core and proximal hip recruitment and positioning and eccentric body weight control with ambulation re-education including up and down stairs     Home Exercise Program:    [x] (64220) Reviewed/Progressed HEP activities related to strengthening, flexibility, endurance, ROM of core, proximal hip and LE for functional self-care, mobility, lifting and ambulation/stair navigation   [] (54912)Reviewed/Progressed HEP activities related to improving balance, coordination, kinesthetic sense, posture, motor skill, proprioception of core, proximal hip and LE for self care, mobility, lifting, and ambulation/stair navigation      Manual Treatments:  PROM / STM / Oscillations-Mobs:  G-I, II, III, IV (PA's, Inf., Post.)  [x] (91907) Provided manual therapy to mobilize LE, proximal hip and/or LS spine soft tissue/joints for the purpose of modulating pain, promoting relaxation,  increasing ROM, reducing/eliminating soft tissue swelling/inflammation/restriction, improving soft tissue extensibility and allowing for proper ROM for normal function with self care, mobility, lifting and ambulation. Spoke with   regarding the use of Dry Needling     Dry needling manual therapy: consisted on the placement of 8 needles in the following muscles:  VM, VL, Patella tendon . A 30-60 mm needle was inserted, piston, rotated, and coned to produce intramuscular mobilization. These techniques were used to restore functional range of motion, reduce muscle spasm and induce healing in the corresponding musculature. (34852)  Clean Technique was utilized today while applying Dry needling treatment. The treatment sites where cleaned with 70% solution of  isopropyl alcohol . The PT washed their hands and utilized treatment gloves along with hand  prior to inserting the needles. All needles where removed and discarded in the appropriate sharps container. MD has given verbal and/or written approval for this treatment.      Attended low frequency (1-20Hz) electrical stimulation was utilized in conjunction with Dry Needling:  the Estim was

## 2018-08-20 ENCOUNTER — HOSPITAL ENCOUNTER (OUTPATIENT)
Dept: PHYSICAL THERAPY | Age: 23
Discharge: HOME | End: 2018-08-21
Admitting: EMERGENCY MEDICINE

## 2018-08-20 NOTE — FLOWSHEET NOTE
Gait Re-education- Provided training and instruction to the patient for proper LE, core and proximal hip recruitment and positioning and eccentric body weight control with ambulation re-education including up and down stairs     Home Exercise Program:    [x] (41479) Reviewed/Progressed HEP activities related to strengthening, flexibility, endurance, ROM of core, proximal hip and LE for functional self-care, mobility, lifting and ambulation/stair navigation   [] (63571)Reviewed/Progressed HEP activities related to improving balance, coordination, kinesthetic sense, posture, motor skill, proprioception of core, proximal hip and LE for self care, mobility, lifting, and ambulation/stair navigation      Manual Treatments:  PROM / STM / Oscillations-Mobs:  G-I, II, III, IV (PA's, Inf., Post.)  [x] (14690) Provided manual therapy to mobilize LE, proximal hip and/or LS spine soft tissue/joints for the purpose of modulating pain, promoting relaxation,  increasing ROM, reducing/eliminating soft tissue swelling/inflammation/restriction, improving soft tissue extensibility and allowing for proper ROM for normal function with self care, mobility, lifting and ambulation. Modalities:      Charges:  Timed Code Treatment Minutes: 39min   Total Treatment Minutes: 39min     [] EVAL (LOW) 95281 (typically 20 minutes face-to-face)  [] EVAL (MOD) 45558 (typically 30 minutes face-to-face)  [] EVAL (HIGH) 60445 (typically 45 minutes face-to-face)  [] RE-EVAL     [x] LY(05597) x  1   [] IONTO  [x] NMR (99919) x  1   [] VASO   [x] Manual (50451) x  1    [] Other:  [] TA x       [] Mech Traction (38967)  [] ES(attended) (23159)      [] ES (un) (98418):     GOALS:  Patient stated goal: full FB in august    Therapist goals for Patient:   Short Term Goals: To be achieved in: 2 weeks  1. Independent in HEP and progression per patient tolerance, in order to prevent re-injury. -MET  2.  Patient will have a decrease in pain to facilitate pain  [] Patient limited by other medical complications  [] Other:     Prognosis: [x] Good [] Fair  [] Poor    Patient Requires Follow-up: [x] Yes  [] No      PLAN: Progress as tolerated- work into strides.    [x] Continue per plan of care [] Alter current plan (see comments)  [] Plan of care initiated [] Hold pending MD visit [] Discharge    Electronically signed by: Roselyn Chu PT

## 2018-08-21 ENCOUNTER — HOSPITAL ENCOUNTER (OUTPATIENT)
Dept: PHYSICAL THERAPY | Age: 23
Discharge: HOME | End: 2018-08-22
Admitting: EMERGENCY MEDICINE

## 2018-08-21 NOTE — FLOWSHEET NOTE
[address]  Phone: [phone] Fax: [phone]          Date:  2018    Patient Name:  Jules Urrutia    :  1995  MRN: [id number]  Restrictions/Precautions:    Medical/Treatment Diagnosis Information:  · Diagnosis: R knee patellar tendonitis  · Treatment Diagnosis: D48.394  Insurance/Certification information:  PT Insurance Information: [place]  Physician Information:  Referring Practitioner: Cannon Cowden of care signed (Y/N):     Date of Patient follow up with Physician:     G-Code (if applicable):      Date G-Code Applied:    PT G-Codes  Functional Assessment Tool Used: LEFS  Score: 25%  Functional Limitation: Mobility: Walking and moving around  Mobility: Walking and Moving Around Current Status (): At least 20 percent but less than 40 percent impaired, limited or restricted  Mobility: Walking and Moving Around Goal Status (): At least 1 percent but less than 20 percent impaired, limited or restricted    Progress Note: [x]  Yes  []  No  Next due by: Visit #10       Latex Allergy:  [x]NO      []YES  Preferred Language for Healthcare:   [x]English       []other:    Visit # Insurance Allowable   23 60/MU   . Pain level:  4/10    SUBJECTIVE:  Less sore today-     OBJECTIVE:     RESTRICTIONS/PRECAUTIONS: poss PT tear    Exercises/Interventions:     Therapeutic Ex 20min Sets/sec Reps Notes   Retro Stepper/BIKE  6 BIKE-ROM   Alter G  3.0, 6.5mph 60%  12    RDL or Ecc HS  15    Belt quad stretching 10 10 end   SC  15    Hip Ext Brandi Arm-   10    Bosu fwd/side lunge2 15 Box Ecc control earth quake   12' BOX   Leg Press Ecc 0-40 2 15 Iso, ecc 0-40    Box plyo lunges    12' bossman/broads/2:1- P   Low plyos sith Rt leg in Ecc pos   60 deg.    Glute kicks  2 15 2 maroon   TRX Uni Lunges   0-50   Armenian Lunges with HHA   0-60 slosh tube with pertubation    Glute wall isos c med ball   b   Sumo/Macedonian    Slow /SS-50   BFR Strength   8min 0Lb 80%, 30/15/15/15 (SLR, LAQ, minisquat)   Chair Uni  Squats 90 deg   2 x airex   Manual Intervention-12min      Knee mobs/PROM  4    Tib/Fem Mobs  4    Patella Mobs  4min g. III/iv inf/sup/rot   IASTM  0min Patella tendon   DN        Patella    NMR [id number]      VMS 10/10  10 w QS/bio   G. Max Activation/prone      Bosu Bal and Prop- G Med 10 10 Quad control throughout rom   Single leg stance/Balance/Prop      Bosu Retro G. Med act 10 10 Glut control       Cues throughout for glute control         Full LE eccentric program with focus on quad ecc control and LE plyometric with landing control    Spoke with Dr. Rhoda Scott regarding the use of BFR with patient. Bloodflow restriction was utilized throughout exercise session with use of Lovely Unit for a period of 8 minutes at  80% Occlusion. Patient's total limp occlusion [facility] time of occlusion. Therapeutic Exercise and NMR EXR  [x] (83891) Provided verbal/tactile cueing for activities related to strengthening, flexibility, endurance, ROM for improvements in LE, proximal hip, and core control with self care, mobility, lifting, ambulation. [x] (47205) Provided verbal/tactile cueing for activities related to improving balance, coordination, kinesthetic sense, posture, motor skill, proprioception  to assist with LE, proximal hip, and core control in self care, mobility, lifting, ambulation and eccentric single leg control.      NMR and Therapeutic Activities:    [x] (14808 or 24719) Provided verbal/tactile cueing for activities related to improving balance, coordination, kinesthetic sense, posture, motor skill, proprioception and motor activation to allow for proper function of core, proximal hip and LE with self care and ADLs  [] (79041) Gait Re-education- Provided training and instruction to the patient for proper LE, core and proximal hip recruitment and positioning and eccentric body weight control with ambulation re-education including up and down stairs     Home Exercise Program:    [x] (68034) Reviewed/Progressed HEP activities related to strengthening, flexibility, endurance, ROM of core, proximal hip and LE for functional self-care, mobility, lifting and ambulation/stair navigation   [] (98869)Reviewed/Progressed HEP activities related to improving balance, coordination, kinesthetic sense, posture, motor skill, proprioception of core, proximal hip and LE for self care, mobility, lifting, and ambulation/stair navigation      Manual Treatments:  PROM / STM / Oscillations-Mobs:  G-I, II, III, IV (PA's, Inf., Post.)  [x] (44232) Provided manual therapy to mobilize LE, proximal hip and/or LS spine soft tissue/joints for the purpose of modulating pain, promoting relaxation,  increasing ROM, reducing/eliminating soft tissue swelling/inflammation/restriction, improving soft tissue extensibility and allowing for proper ROM for normal function with self care, mobility, lifting and ambulation. Modalities:      Charges:  Timed Code Treatment Minutes: 44min   Total Treatment Minutes: 64min     [] EVAL (LOW) 01523 (typically 20 minutes face-to-face)  [] EVAL (MOD) 55588 (typically 30 minutes face-to-face)  [] EVAL (HIGH) 28757 (typically 45 minutes face-to-face)  [] RE-EVAL     [x] NC(67663) x  1   [] IONTO  [x] NMR (14756) x  1   [x] VASO - post injection  [x] Manual (07944) x  1    [] Other:  [] TA x       [] Mech Traction (13965)  [] ES(attended) (65078)      [] ES (un) (07018):     GOALS:  Patient stated goal: full FB in august    Therapist goals for Patient:   Short Term Goals: To be achieved in: 2 weeks  1. Independent in HEP and progression per patient tolerance, in order to prevent re-injury. -MET  2.  Patient will have a decrease in pain to facilitate improvement in movement, function, and ADLs as indicated by Functional

## 2018-08-23 ENCOUNTER — HOSPITAL ENCOUNTER (OUTPATIENT)
Dept: PHYSICAL THERAPY | Age: 23
Discharge: HOME | End: 2018-08-24
Admitting: EMERGENCY MEDICINE

## 2018-08-23 NOTE — FLOWSHEET NOTE
0-60 slosh tube with pertubation    Glute wall isos c med ball   b   Sumo/Irish   15 Slow /SS-50   BFR Strength   8min 0Lb 80%, 30/15/15/15 (SLR, LAQ, minisquat)   Chair Uni  Squats 90 deg   2 x airex   Manual Intervention-16min      Knee mobs/PROM  2    Tib/Fem Mobs  2    Patella Mobs  2min g. III/iv inf/sup/rot   IASTM  0min Patella tendon   DN 10       Patella    NMR [id number]      VMS 10/10  10 w QS/bio   G. Max Activation/prone      Bosu Bal and Prop- G Med 10 10 Quad control throughout rom   Single leg stance/Balance/Prop      Bosu Retro G. Med act 10 10 Glute control       Cues throughout for glute control         Full LE eccentric program with focus on quad ecc control and LE plyometric with landing control    Spoke with Dr. Juana Marie regarding the use of BFR with patient. Bloodflow restriction was utilized throughout exercise session with use of Lovely Unit for a period of 8 minutes at  80% Occlusion. Patient's total limp occlusion pressure was calculated and monitored by [facility] for the entire time of occlusion. Therapeutic Exercise and NMR EXR  [x] (94910) Provided verbal/tactile cueing for activities related to strengthening, flexibility, endurance, ROM for improvements in LE, proximal hip, and core control with self care, mobility, lifting, ambulation. [x] (65419) Provided verbal/tactile cueing for activities related to improving balance, coordination, kinesthetic sense, posture, motor skill, proprioception  to assist with LE, proximal hip, and core control in self care, mobility, lifting, ambulation and eccentric single leg control.      NMR and Therapeutic Activities:    [x] (95297 or 18548) Provided verbal/tactile cueing for activities related to improving balance, coordination, kinesthetic sense, posture, motor skill, proprioception and motor activation to allow for proper function of core, proximal hip and LE with self care and ADLs  [] (07516) Gait Re-education- Provided written approval for this treatment. Attended low frequency (1-20Hz) electrical stimulation was utilized in conjunction with Dry Needling:  the Estim was manipulated between all above needles for a period of 10 min. at 4 volts. The low frequency electrical stimulation was used to help reduce muscle spasm and help to interrupt /Dell the pain cycle. (15540)     Modalities:      Charges:  Timed Code Treatment Minutes: 58min   Total Treatment Minutes: 58min     [] EVAL (LOW) 96740 (typically 20 minutes face-to-face)  [] EVAL (MOD) 59316 (typically 30 minutes face-to-face)  [] EVAL (HIGH) 81045 (typically 45 minutes face-to-face)  [] RE-EVAL     [x] PU(73330) x  2   [] IONTO  [x] NMR (04296) x  1   [x] VASO - post injection  [x] Manual (50796) x  1    [] Other:  [] TA x       [] Mech Traction (88365)  [] ES(attended) (36801)      [] ES (un) (03590):     GOALS:  Patient stated goal: full FB in august    Therapist goals for Patient:   Short Term Goals: To be achieved in: 2 weeks  1. Independent in HEP and progression per patient tolerance, in order to prevent re-injury. -MET  2. Patient will have a decrease in pain to facilitate improvement in movement, function, and ADLs as indicated by Functional Deficits. -PROGRESSING     Long Term Goals: To be achieved in: 8 weeks  1. Disability index score of 4% or less for the LEFS to assist with reaching prior level of function. -NOT MET  2. Patient will demonstrate increased AROM to WNL to allow for proper joint functioning as indicated by patients Functional Deficits. -NOT MET  3. Patient will demonstrate an increase in Strength to good proximal hip strength and control, within 5lb HHD in LE to allow for proper functional mobility as indicated by patients Functional Deficits. -MET  4. Patient will return to single leg eccentric functional activities without increased symptoms or restriction. -MUCH IMPROVED  5. Patient to return to running, cutting, jumping without restriction.

## 2018-08-27 ENCOUNTER — HOSPITAL ENCOUNTER (OUTPATIENT)
Dept: PHYSICAL THERAPY | Age: 23
Discharge: HOME | End: 2018-08-28
Admitting: EMERGENCY MEDICINE

## 2018-08-27 NOTE — FLOWSHEET NOTE
[address] [place] [address] [place] [address]  Phone: [phone] Fax: [phone]          Date:  2018    Patient Name:  Madhuri Barry    :  1995  MRN: [id number]  Restrictions/Precautions:    Medical/Treatment Diagnosis Information:  · Diagnosis: R knee patellar tendonitis  · Treatment Diagnosis: Z46.759  Insurance/Certification information:  PT Insurance Information: [employer]  Physician Information:  Referring Practitioner: Lane Chan of care signed (Y/N):     Date of Patient follow up with Physician:     G-Code (if applicable):      Date G-Code Applied:    PT G-Codes  Functional Assessment Tool Used: LEFS  Score: 25%  Functional Limitation: Mobility: Walking and moving around  Mobility: Walking and Moving Around Current Status (): At least 20 percent but less than 40 percent impaired, limited or restricted  Mobility: Walking and Moving Around Goal Status (): At least 1 percent but less than 20 percent impaired, limited or restricted    Progress Note: [x]  Yes  []  No  Next due by: Visit #10       Latex Allergy:  [x]NO      []YES  Preferred Language for Healthcare:   [x]English       []other:    Visit # Insurance Allowable   25 60/MU   . Pain level:  4/10    SUBJECTIVE:  Patient reports that his knee is doing well. No complaints of pain entering PT today. Patient reports that he has the most difficulty with stairs and cutting.      OBJECTIVE:     RESTRICTIONS/PRECAUTIONS: poss PT tear    Exercises/Interventions:     Therapeutic Ex 30min Sets/sec Reps Notes   Retro Stepper/BIKE  6 BIKE-ROM   Alter G  3.0, 6.5mph 60%      RDL or Ecc HS  15    Belt quad stretching   end   SC march-       Hip Ext Flaco Jessica-   10    Bosu fwd/side lunge2 15 Box Ecc control earth quake   12' BOX   Leg Press Ecc 0-40 2 15 Iso, ecc 0-50    Box plyo lunges    12' bossman/broads/2:1- P   Low plyos split  15    Glute kicks  2 15 2 with self care and ADLs  [] (89652) Gait Re-education- Provided training and instruction to the patient for proper LE, core and proximal hip recruitment and positioning and eccentric body weight control with ambulation re-education including up and down stairs     Home Exercise Program:    [x] (59069) Reviewed/Progressed HEP activities related to strengthening, flexibility, endurance, ROM of core, proximal hip and LE for functional self-care, mobility, lifting and ambulation/stair navigation   [] (34029)Reviewed/Progressed HEP activities related to improving balance, coordination, kinesthetic sense, posture, motor skill, proprioception of core, proximal hip and LE for self care, mobility, lifting, and ambulation/stair navigation      Manual Treatments:  PROM / STM / Oscillations-Mobs:  G-I, II, III, IV (PA's, Inf., Post.)  [x] (90286) Provided manual therapy to mobilize LE, proximal hip and/or LS spine soft tissue/joints for the purpose of modulating pain, promoting relaxation,  increasing ROM, reducing/eliminating soft tissue swelling/inflammation/restriction, improving soft tissue extensibility and allowing for proper ROM for normal function with self care, mobility, lifting and ambulation. Spoke with   regarding the use of Dry Needling     Dry needling manual therapy: consisted on the placement of 6 needles in the following muscles:  med PF jt, patellar FP. A 40 mm needle was inserted, piston, rotated, and coned to produce intramuscular mobilization. These techniques were used to restore functional range of motion, reduce muscle spasm and induce healing in the corresponding musculature. (74648)  Clean Technique was utilized today while applying Dry needling treatment. The treatment sites where cleaned with 70% solution of  isopropyl alcohol . The PT washed their hands and utilized treatment gloves along with hand  prior to inserting the needles.   All needles where removed and discarded in Patient to return to running, cutting, jumping without restriction. -PROGRESSING BUT NOT MET    Progression Towards Functional goals:  [x] Patient is progressing as expected towards functional goals listed. [] Progression is slowed due to complexities listed- long history patella tendon pain. .  [] Progression has been slowed due to co-morbidities. [] Plan just implemented, too soon to assess goals progression  [] Other:     ASSESSMENT: Good ROM, full ROM available, still slightly guarded at end. Good tolerance to BFR and improved quad after VMS. Good tolerance to BW CKC progression with no pain or symptoms. Some pain in med PF area with leg in a drag position. Needs continued decelerative and eccentric strength. Patient show limited NMR control today. Difficulty with step down. Cues to avoid femoral IR. Return to Play: (if applicable)   [x]  Stage 1: Intro to Strength   [x]  Stage 2: Return to Run and Strength   [x]  Stage 3: Return to Jump and Strength- per ant knee soreness   [x]  Stage 4: Dynamic Strength and Agility   []  Stage 5: Sport Specific Training     []  Ready to Return to Play, Meets All Above Stages   []  Not Ready for Return to Sports   Comments:   Begin light deceleration activity and change of direction with limited volume                       Treatment/Activity Tolerance:  [x] Patient tolerated treatment well [] Patient limited by fatique  [] Patient limited by pain  [] Patient limited by other medical complications  [] Other:     Prognosis: [x] Good [] Fair  [] Poor    Patient Requires Follow-up: [x] Yes  [] No      PLAN: Progress as tolerated- work into strides, increase CKC as able. Work in to individuals.     [x] Continue per plan of care [] Alter current plan (see comments)  [] Plan of care initiated [] Hold pending MD visit [] Discharge    Electronically signed by: Анна Juan PTA

## 2018-09-01 ENCOUNTER — HOSPITAL ENCOUNTER (OUTPATIENT)
Dept: OTHER | Age: 23
Discharge: HOME | End: 2018-09-01
Attending: EMERGENCY MEDICINE | Admitting: EMERGENCY MEDICINE

## 2018-09-10 ENCOUNTER — HOSPITAL ENCOUNTER (OUTPATIENT)
Dept: PHYSICAL THERAPY | Age: 23
Discharge: HOME | End: 2018-09-11
Admitting: EMERGENCY MEDICINE

## 2018-09-10 NOTE — FLOWSHEET NOTE
[address] [place], [place] [address]  Phone: [phone] Fax: [phone]          Date:  9/10/2018    Patient Name:  Darren Black    :  1995  MRN: [id number]  Restrictions/Precautions:    Medical/Treatment Diagnosis Information:  · Diagnosis: R knee patellar tendonitis  · Treatment Diagnosis: X66.842  Insurance/Certification information:  PT Insurance Information: [employer]  Physician Information:  Referring Practitioner: Alban Trotter of care signed (Y/N):     Date of Patient follow up with Physician:     G-Code (if applicable):      Date G-Code Applied:    PT G-Codes  Functional Assessment Tool Used: LEFS  Score: 25%  Functional Limitation: Mobility: Walking and moving around  Mobility: Walking and Moving Around Current Status (): At least 20 percent but less than 40 percent impaired, limited or restricted  Mobility: Walking and Moving Around Goal Status (): At least 1 percent but less than 20 percent impaired, limited or restricted    Progress Note: [x]  Yes  []  No  Next due by: Visit #10       Latex Allergy:  [x]NO      []YES  Preferred Language for Healthcare:   [x]English       []other:    Visit # Insurance Allowable   26 60/MU   . Pain level:  4/10    SUBJECTIVE:  Patient reports that overall his knee has improved. He is able to cut and practice but still has soreness immediately after. Patient still has a lot of difficulty with steps.      OBJECTIVE:     RESTRICTIONS/PRECAUTIONS: poss PT tear    Exercises/Interventions:     Therapeutic Ex 30min Sets/sec Reps Notes   Retro Stepper/BIKE  6 BIKE-ROM   Alter G  3.0, 6.5mph 60%      RDL or Ecc HS  15    Belt quad stretching   end   SC march-    Attempted ecc but painful    Hip Ext Bebo Raider-   10    Bosu fwd/side lunge2 15 Box Ecc control earth quake   12' BOX   Leg Press Ecc 0-40 2 15 Iso, ecc 0-50    Box plyo lunges    12' bossman/broads/2:1- P   Low plyos split  15    Glute kicks  2 15 2 maroon   TRX Uni Lunges  15 0-50   Chinese Lunges with HHA   0-60 slosh tube with pertubation    Glute wall isos c med ball   b   Sumo/Singaporean   15 Slow /SS-50   BFR Strength   8min 0Lb 80%, 30/15/15/15 (SLR, LAQ, minisquat)   Chair Uni  Squats 90 deg   2 x airex   Manual Intervention-16min      Knee mobs/PROM  2    Tib/Fem Mobs  2    Patella Mobs  2min g. III/iv inf/sup/rot   IASTM  0min Patella tendon   DN 10       Patella    NMR [id number]      VMS 10/10  10 w QS/bio   G. Max Activation/prone      Bosu Bal and Prop- G Med 10 10 Quad control throughout rom   Single leg stance/Balance/Prop      Bosu Retro G. Med act 10 10 Glute control       Cues throughout for glute control         Full LE eccentric program with focus on quad ecc control and LE plyometric with landing control    Spoke with Dr. Jesus Boles regarding the use of BFR with patient. Bloodflow restriction was utilized throughout exercise session with use of Lovely Unit for a period of 8 minutes at  80% Occlusion. Patient's total limp occlusion pressure [facility] the entire time of occlusion. Therapeutic Exercise and NMR EXR  [x] (12500) Provided verbal/tactile cueing for activities related to strengthening, flexibility, endurance, ROM for improvements in LE, proximal hip, and core control with self care, mobility, lifting, ambulation. [x] (27625) Provided verbal/tactile cueing for activities related to improving balance, coordination, kinesthetic sense, posture, motor skill, proprioception  to assist with LE, proximal hip, and core control in self care, mobility, lifting, ambulation and eccentric single leg control.      NMR and Therapeutic Activities:    [x] (63628 or 84041) Provided verbal/tactile cueing for activities related to improving balance, coordination, kinesthetic sense, posture, motor skill, proprioception and motor activation to allow for proper without increased symptoms or restriction. -MUCH IMPROVED  5. Patient to return to running, cutting, jumping without restriction. -PROGRESSING BUT NOT MET    Progression Towards Functional goals:  [x] Patient is progressing as expected towards functional goals listed. [] Progression is slowed due to complexities listed- long history patella tendon pain. .  [] Progression has been slowed due to co-morbidities. [] Plan just implemented, too soon to assess goals progression  [] Other:     ASSESSMENT: Good ROM, full ROM available, still slightly guarded at end. Good tolerance to BFR and improved quad after VMS. Good tolerance to BW CKC progression with no pain or symptoms. Some pain in med PF area with leg in a drag position. Needs continued decelerative and eccentric strength. Patient show limited NMR control today. Difficulty with step down. Cues to avoid femoral IR. 6.5-7/10 pain at conclusion. Return to Play: (if applicable)   [x]  Stage 1: Intro to Strength   [x]  Stage 2: Return to Run and Strength   [x]  Stage 3: Return to Jump and Strength- per ant knee soreness   [x]  Stage 4: Dynamic Strength and Agility   []  Stage 5: Sport Specific Training     []  Ready to Return to Play, Meets All Above Stages   []  Not Ready for Return to Sports   Comments:   Begin light deceleration activity and change of direction with limited volume                       Treatment/Activity Tolerance:  [x] Patient tolerated treatment well [] Patient limited by fatique  [] Patient limited by pain  [] Patient limited by other medical complications  [] Other:     Prognosis: [x] Good [] Fair  [] Poor    Patient Requires Follow-up: [x] Yes  [] No      PLAN: Progress as tolerated- work into strides, increase CKC as able. Work in to individuals.     [x] Continue per plan of care [] Alter current plan (see comments)  [] Plan of care initiated [] Hold pending MD visit [] Discharge    Electronically signed by: Matilde Duvall PTA

## 2018-09-26 ENCOUNTER — OFFICE VISIT (OUTPATIENT)
Dept: ORTHOPEDIC SURGERY | Age: 23
End: 2018-09-26
Payer: COMMERCIAL

## 2018-09-26 VITALS
HEIGHT: 77 IN | SYSTOLIC BLOOD PRESSURE: 118 MMHG | BODY MASS INDEX: 27.16 KG/M2 | DIASTOLIC BLOOD PRESSURE: 68 MMHG | WEIGHT: 230 LBS

## 2018-09-26 DIAGNOSIS — M25.561 ACUTE PAIN OF RIGHT KNEE: Primary | ICD-10-CM

## 2018-09-26 PROCEDURE — 20610 DRAIN/INJ JOINT/BURSA W/O US: CPT | Performed by: ORTHOPAEDIC SURGERY

## 2018-09-26 PROCEDURE — 99214 OFFICE O/P EST MOD 30 MIN: CPT | Performed by: ORTHOPAEDIC SURGERY

## 2018-09-26 RX ORDER — ALBUTEROL SULFATE 90 UG/1
2 AEROSOL, METERED RESPIRATORY (INHALATION)
COMMUNITY
Start: 2016-08-26 | End: 2018-11-05

## 2018-09-26 RX ORDER — DICLOFENAC SODIUM 75 MG/1
75 TABLET, DELAYED RELEASE ORAL
COMMUNITY
Start: 2018-04-10 | End: 2018-11-05

## 2018-09-26 NOTE — PROGRESS NOTES
moderate obvious  swelling and joint effusion     Palpation:   Palpation reveals no  Pain medial joint line,   No lateral joint line pain,  moderate  joint effusion    Range of Motion:  0 - 150° flexion/ extension   Hip extension to 20 hip flexion to 70+  Lumbar ROM -20 extension flexion to 6 inches from the floor      Strength: Quadriceps testing 5/5 , hamstring muscle testing 5/5, EHL against resistance is 5/5, hip flexor strength is intact 5/5, internal and external rotation of the hip against resistance is also intact 5/5    Special Tests: stable Lachman, negative anterior drawer, negative pivot shift, no posterior sag no posterior drawer does not open to valgus or varus stress at 0 or 30° negative, Steinmann's negative, Shannan's negative, Homans negative Otis, pedal pulses are +2/4 capillary refill is brisk sensation is intact ankle exam and hip exam are shows no pain with full range of motion provocative testing of the hip is nonpainful muscle testing around the hip is 5 over 5. Lumbar flexion to 6 inches from floor with out pain      Inspection:      Gait: antalgic     Reflex:    Lower extremity Deep tendon reflexes +2/4 and equal bilaterally for patella and Achilles  Upper extremity reflexes:  of the biceps, triceps, brachioradialis +2/4 equal bilaterally    Contralateral  Knee: Negative Lachman negative anterior drawer negative pivot shift no posterior sag no posterior drawer does not open to valgus or varus stress at 0 or 30° negative Steinmann's negative Shannan's negative Homans negative Otis pedal pulses are +2/4 capillary refill is brisk sensation is intact ankle exam and hip exam are shows no pain with full range of motion provocative testing of the hip is nonpainful muscle testing around the hip is 5 over 5. Hip and lumbar testing does not reproduce pain evocative testing does not reproduce symptomatology.           Additional Examinations:  Left Lower Extremity: Examination of the left lower extremity does not show any tenderness, deformity or injury. Range of motion is unremarkable. There is no gross instability. There are no rashes, ulcerations or lesions. Strength and tone are normal.  Right Upper Extremity:  Examination of the right upper extremity does not show any tenderness, deformity or injury. Range of motion is unremarkable. There is no gross instability. There are no rashes, ulcerations or lesions. Strength and tone are normal.  Left Upper Extremity: Examination of the left upper extremity does not show any tenderness, deformity or injury. Range of motion is unremarkable. There is no gross instability. There are no rashes, ulcerations or lesions. Strength and tone are normal.  Thoracic Spine: Examination of the thoracic spine does not show any tenderness, deformity or injury. Range of motion is unremarkable. There is no gross instability. There are no rashes, ulcerations or lesions. Strength and tone are normal.  Lower Back: Examination of the lower back does not show any tenderness, deformity or injury. Range of motion is unremarkable. There is no gross instability. There are no rashes, ulcerations or lesions. Strength and tone are normal.    History reviewed. No pertinent surgical history. .          Assessment :  Right knee joint effusion    Impression: Right knee bloody joint effusion    Office Procedures:I discussed in detail the risks, benefits and complications of an injection which included but are not limited to infection, skin reactions, hot swollen joint, and anaphylaxis with the patient. The patient verbalized understanding and gave informed consent for the injection. The patient's skin was prepped using sterile alcohol solution. A sterile 22-gauge needle was inserted into the right knee and a mixture of 3ml of 6mg/ml Celestone, 7mL of 0.5% Marcaine  was injected under sterile technique.  The needle was withdrawn and the puncture site sealed with a

## 2018-11-05 ENCOUNTER — OFFICE VISIT (OUTPATIENT)
Dept: PRIMARY CARE CLINIC | Age: 23
End: 2018-11-05
Payer: COMMERCIAL

## 2018-11-05 VITALS
SYSTOLIC BLOOD PRESSURE: 118 MMHG | TEMPERATURE: 97.5 F | HEART RATE: 71 BPM | OXYGEN SATURATION: 99 % | BODY MASS INDEX: 25.82 KG/M2 | DIASTOLIC BLOOD PRESSURE: 80 MMHG | WEIGHT: 217.7 LBS

## 2018-11-05 DIAGNOSIS — B34.9 VIRAL INFECTION: Primary | ICD-10-CM

## 2018-11-05 PROCEDURE — 99202 OFFICE O/P NEW SF 15 MIN: CPT | Performed by: INTERNAL MEDICINE

## 2018-11-05 RX ORDER — ONDANSETRON 4 MG/1
4 TABLET, FILM COATED ORAL DAILY PRN
Qty: 30 TABLET | Refills: 0 | Status: SHIPPED | OUTPATIENT
Start: 2018-11-05

## 2018-11-05 ASSESSMENT — ENCOUNTER SYMPTOMS
COUGH: 0
DIARRHEA: 0
SINUS PAIN: 0
SORE THROAT: 1
NAUSEA: 1
VOMITING: 1
CHOKING: 0
SHORTNESS OF BREATH: 0
SINUS PRESSURE: 0